# Patient Record
Sex: MALE | Race: BLACK OR AFRICAN AMERICAN | NOT HISPANIC OR LATINO | URBAN - METROPOLITAN AREA
[De-identification: names, ages, dates, MRNs, and addresses within clinical notes are randomized per-mention and may not be internally consistent; named-entity substitution may affect disease eponyms.]

---

## 2019-08-04 ENCOUNTER — EMERGENCY (EMERGENCY)
Facility: HOSPITAL | Age: 24
LOS: 1 days | Discharge: HOME | End: 2019-08-04
Attending: EMERGENCY MEDICINE
Payer: SELF-PAY

## 2019-08-04 VITALS
DIASTOLIC BLOOD PRESSURE: 95 MMHG | SYSTOLIC BLOOD PRESSURE: 175 MMHG | WEIGHT: 214.95 LBS | HEIGHT: 75 IN | RESPIRATION RATE: 18 BRPM | TEMPERATURE: 99 F | HEART RATE: 726 BPM | OXYGEN SATURATION: 96 %

## 2019-08-04 VITALS
SYSTOLIC BLOOD PRESSURE: 117 MMHG | DIASTOLIC BLOOD PRESSURE: 76 MMHG | OXYGEN SATURATION: 98 % | TEMPERATURE: 98 F | HEART RATE: 89 BPM | RESPIRATION RATE: 18 BRPM

## 2019-08-04 PROBLEM — Z00.00 ENCOUNTER FOR PREVENTIVE HEALTH EXAMINATION: Status: ACTIVE | Noted: 2019-08-04

## 2019-08-04 PROCEDURE — 73090 X-RAY EXAM OF FOREARM: CPT

## 2019-08-04 PROCEDURE — 70450 CT HEAD/BRAIN W/O DYE: CPT

## 2019-08-04 PROCEDURE — 72125 CT NECK SPINE W/O DYE: CPT | Mod: 26

## 2019-08-04 PROCEDURE — 73090 X-RAY EXAM OF FOREARM: CPT | Mod: 26,LT

## 2019-08-04 PROCEDURE — 70450 CT HEAD/BRAIN W/O DYE: CPT | Mod: 26

## 2019-08-04 PROCEDURE — 99284 EMERGENCY DEPT VISIT MOD MDM: CPT | Mod: 25

## 2019-08-04 PROCEDURE — 99053 MED SERV 10PM-8AM 24 HR FAC: CPT

## 2019-08-04 PROCEDURE — 99284 EMERGENCY DEPT VISIT MOD MDM: CPT

## 2019-08-04 PROCEDURE — 72125 CT NECK SPINE W/O DYE: CPT

## 2019-08-04 RX ORDER — ACETAMINOPHEN 500 MG
975 TABLET ORAL ONCE
Refills: 0 | Status: COMPLETED | OUTPATIENT
Start: 2019-08-04 | End: 2019-08-04

## 2019-08-04 RX ORDER — METHOCARBAMOL 500 MG/1
1500 TABLET, FILM COATED ORAL ONCE
Refills: 0 | Status: COMPLETED | OUTPATIENT
Start: 2019-08-04 | End: 2019-08-04

## 2019-08-04 RX ORDER — METHOCARBAMOL 500 MG/1
1 TABLET, FILM COATED ORAL
Qty: 21 | Refills: 0
Start: 2019-08-04 | End: 2019-08-10

## 2019-08-04 RX ADMIN — METHOCARBAMOL 1500 MILLIGRAM(S): 500 TABLET, FILM COATED ORAL at 02:52

## 2019-08-04 RX ADMIN — Medication 975 MILLIGRAM(S): at 02:53
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
## 2019-08-04 NOTE — ED PROVIDER NOTE - CHPI ED SYMPTOMS NEG
no sleeping issues/no bruising/no laceration/no disorientation/no dizziness/no decreased eating/drinking/no headache/no loss of consciousness/no crying/no fussiness
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
## 2019-08-04 NOTE — ED PROVIDER NOTE - PROGRESS NOTE DETAILS
PA NOTE: neg ct of head and neck, neg xray for fracture, Pt reassessed, pt feeling better at this time, pain has improved, vss, pt able to walk, talk and vocalized plan of action. Discussed in depth the results and findings that were performed in the ED and explained to pt in depth the next steps that need to be taking including any medications and proper follow up with PCP or specialists. Pt verbalized their concerns and all questions were answered. Pt understands dispo and agrees with discharge.
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
## 2019-08-16 NOTE — ED ADULT NURSE NOTE - INTEGUMENTARY WDL
Color consistent with ethnicity/race, warm, dry intact, resilient. Unna Boot Text: An Unna boot was placed to help immobilize the limb and facilitate more rapid healing.

## 2021-02-21 ENCOUNTER — EMERGENCY (EMERGENCY)
Facility: HOSPITAL | Age: 26
LOS: 1 days | Discharge: HOME | End: 2021-02-21
Attending: EMERGENCY MEDICINE
Payer: COMMERCIAL

## 2021-02-21 VITALS
RESPIRATION RATE: 18 BRPM | DIASTOLIC BLOOD PRESSURE: 81 MMHG | OXYGEN SATURATION: 97 % | HEART RATE: 73 BPM | SYSTOLIC BLOOD PRESSURE: 149 MMHG | HEIGHT: 75 IN | WEIGHT: 195.11 LBS | TEMPERATURE: 98 F

## 2021-02-21 PROCEDURE — 12011 RPR F/E/E/N/L/M 2.5 CM/<: CPT

## 2021-02-21 PROCEDURE — 70450 CT HEAD/BRAIN W/O DYE: CPT

## 2021-02-21 PROCEDURE — 99284 EMERGENCY DEPT VISIT MOD MDM: CPT | Mod: 25

## 2021-02-21 PROCEDURE — 70450 CT HEAD/BRAIN W/O DYE: CPT | Mod: 26,MB

## 2021-02-21 NOTE — ED PROVIDER NOTE - PHYSICAL EXAMINATION
General: In NAD, non-toxic/well-appearing; well nourished/developed.  Skin: Warm, dry, color normal for race. Approx. 2cm deep horizontal "Y-shaped" laceration to left of left eyebrow/eyelid. Bleeding controlled.   Head: Normocephalic/atraumatic.   Eyes: Sclera anicteric, conjunctivae clear b/l. PERRLA, EOMI. No nystagmus.  Neck: Supple, FROM. No midline spinal tenderness.   Cardio: Rate and rhythm regular, S1 & S2 clear. No audible murmur.  Resp: Normal AP to lateral diameter, symmetrical excursion b/l. Breath sounds vesicular, symmetrical and without rales, rhonchi or wheezing b/l.  MSK: HANNON x4. FROM.  Neuro: A&Ox3. No motor/sensory deficits. Normal gait.

## 2021-02-21 NOTE — ED ADULT TRIAGE NOTE - CHIEF COMPLAINT QUOTE
Pt comes in with laceration to L side of eyebrow after trip and fall on black ice, bleeding controlled, - LOC, no complaints at this time.

## 2021-02-21 NOTE — ED PROVIDER NOTE - PATIENT PORTAL LINK FT
You can access the FollowMyHealth Patient Portal offered by [facility] by registering at the following website: [email or web address]. By joining [facility]’s FollowMyHealth portal, you will also be able to view your health information using other applications (apps) compatible with our system.

## 2021-02-21 NOTE — ED PROVIDER NOTE - CARE PROVIDER_API CALL
Gigi Triplett)  Plastic Surgery  [address] [place] [address]  Phone: [phone]  Fax: [phone]  Follow Up Time:
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
## 2021-02-21 NOTE — ED PROVIDER NOTE - NSFOLLOWUPINSTRUCTIONS_ED_ALL_ED_FT
- Ibuprofen/acetaminophen for pain.  - Keep dressing clean, dry and in place for 24 hours. Then, may remove and cleanse using soap and water.  - Apply Bacitracin as needed.  - Suture removal 5 days. May present to primary care doctor, urgent care, or ED.  - Keep out of sun.  - Please follow up with your primary care physician in 24-48 hours.  - Please seek immediate medical attention for any new/worsening, signs/symptoms or concerns including but not limited to severe pain/swelling/surrounding redness, or drainage from wound.    Feel better!      Sutured Wound Care      Sutures are stitches that can be used to close wounds. Taking care of your wound properly can help to prevent pain and infection. It can also help your wound to heal more quickly. Follow instructions from your health care provider about how to care for your sutured wound.      Supplies needed:    •Soap and water.      •A clean bandage (dressing), if needed.      •Antibiotic ointment.      •A clean towel.        How to care for your sutured wound     •Keep the wound completely dry for the first 24 hours, or for as long as directed by your health care provider. After 24–48 hours, you may shower or bathe as directed by your health care provider. Do not soak or submerge the wound in water until the sutures have been removed.    •After the first 24 hours, clean the wound once a day, or as often as directed by your health care provider, using the following steps:  •Wash the wound with soap and water.      •Rinse the wound with water to remove all soap.      •Pat the wound dry with a clean towel. Do not rub the wound.        •After cleaning the wound, apply a thin layer of antibiotic ointment as directed by your health care provider. This will prevent infection and keep the dressing from sticking to the wound.    •Follow instructions from your health care provider about how to change your dressing:  •Wash your hands with soap and water. If soap and water are not available, use hand .      •Change your dressing at least once a day, or as often as told by your health care provider. If your dressing gets wet or dirty, change it.      •Leave sutures and other skin closures, such as adhesive tape or skin glue, in place. These skin closures may need to stay in place for 2 weeks or longer. If adhesive strip edges start to loosen and curl up, you may trim the loose edges. Do not remove adhesive strips completely unless your health care provider tells you to do that.      •Check your wound every day for signs of infection. Watch for:  •Redness, swelling, or pain.      •Fluid or blood.      •Warmth.      •Pus or a bad smell.        •Have the sutures removed as directed by your health care provider.        Follow these instructions at home:    Medicines     •Take or apply over-the-counter and prescription medicines only as told by your health care provider.      •If you were prescribed an antibiotic medicine or ointment, take or apply it as told by your health care provider. Do not stop using the antibiotic even if your condition improves.      General instructions     •To help reduce scarring after your wound heals, cover your wound with clothing or apply sunscreen of at least 30 SPF whenever you are outside.      • Do not scratch or pick at your wound.      •Avoid stretching your wound.      •Raise (elevate) the injured area above the level of your heart while you are sitting or lying down, if possible.      •Drink enough fluids to keep your urine clear or pale yellow.      •Keep all follow-up visits as told by your health care provider. This is important.        Contact a health care provider if:    •You received a tetanus shot and you have swelling, severe pain, redness, or bleeding at the injection site.      •Your wound breaks open.      •You have redness, swelling, or pain around your wound.      •You have fluid or blood coming from your wound.      •Your wound feels warm to the touch.      •You have a fever.      •You notice something coming out of your wound, such as wood or glass.      •You have pain that does not get better with medicine.      •The skin near your wound changes color.      •You need to change your dressing very frequently due to a lot of fluid, blood, or pus draining from the wound.      •You develop a new rash.      •You develop numbness around the wound.        Get help right away if:    •You develop severe swelling around your wound.      •You have pus or a bad smell coming from your wound.      •Your pain suddenly gets worse and is severe.      •You develop painful lumps near your wound or anywhere on your body.      •You have a red streak going away from your wound.    •The wound is on your hand or foot and:  •You cannot properly move a finger or toe.      •Your fingers or toes look pale or bluish.      •You have numbness that is spreading down your hand, foot, fingers, or toes.          Summary    •Sutures are stitches that can be used to close wounds.      •Taking care of your wound properly can help to prevent pain and infection.      •Keep the wound completely dry for the first 24 hours, or for as long as directed by your health care provider. After 24–48 hours, you may shower or bathe as directed by your health care provider.      This information is not intended to replace advice given to you by your health care provider. Make sure you discuss any questions you have with your health care provider. - Ibuprofen/acetaminophen for pain.  - Keep dressing clean, dry and in place for 24 hours. Then, may remove and cleanse using soap and water.  - Apply Bacitracin as needed.  - Suture removal 5 days. May present to primary care doctor, urgent care, or ED.  - You will have a scar. Keep out of sun for best healing.  - Please follow up with your primary care physician in 24-48 hours.  - Please seek immediate medical attention for any new/worsening, signs/symptoms or concerns including but not limited to severe pain/swelling/surrounding redness, or drainage from wound.    Feel better!      Sutured Wound Care      Sutures are stitches that can be used to close wounds. Taking care of your wound properly can help to prevent pain and infection. It can also help your wound to heal more quickly. Follow instructions from your health care provider about how to care for your sutured wound.      Supplies needed:    •Soap and water.      •A clean bandage (dressing), if needed.      •Antibiotic ointment.      •A clean towel.        How to care for your sutured wound     •Keep the wound completely dry for the first 24 hours, or for as long as directed by your health care provider. After 24–48 hours, you may shower or bathe as directed by your health care provider. Do not soak or submerge the wound in water until the sutures have been removed.    •After the first 24 hours, clean the wound once a day, or as often as directed by your health care provider, using the following steps:  •Wash the wound with soap and water.      •Rinse the wound with water to remove all soap.      •Pat the wound dry with a clean towel. Do not rub the wound.        •After cleaning the wound, apply a thin layer of antibiotic ointment as directed by your health care provider. This will prevent infection and keep the dressing from sticking to the wound.    •Follow instructions from your health care provider about how to change your dressing:  •Wash your hands with soap and water. If soap and water are not available, use hand .      •Change your dressing at least once a day, or as often as told by your health care provider. If your dressing gets wet or dirty, change it.      •Leave sutures and other skin closures, such as adhesive tape or skin glue, in place. These skin closures may need to stay in place for 2 weeks or longer. If adhesive strip edges start to loosen and curl up, you may trim the loose edges. Do not remove adhesive strips completely unless your health care provider tells you to do that.      •Check your wound every day for signs of infection. Watch for:  •Redness, swelling, or pain.      •Fluid or blood.      •Warmth.      •Pus or a bad smell.        •Have the sutures removed as directed by your health care provider.        Follow these instructions at home:    Medicines     •Take or apply over-the-counter and prescription medicines only as told by your health care provider.      •If you were prescribed an antibiotic medicine or ointment, take or apply it as told by your health care provider. Do not stop using the antibiotic even if your condition improves.      General instructions     •To help reduce scarring after your wound heals, cover your wound with clothing or apply sunscreen of at least 30 SPF whenever you are outside.      • Do not scratch or pick at your wound.      •Avoid stretching your wound.      •Raise (elevate) the injured area above the level of your heart while you are sitting or lying down, if possible.      •Drink enough fluids to keep your urine clear or pale yellow.      •Keep all follow-up visits as told by your health care provider. This is important.        Contact a health care provider if:    •You received a tetanus shot and you have swelling, severe pain, redness, or bleeding at the injection site.      •Your wound breaks open.      •You have redness, swelling, or pain around your wound.      •You have fluid or blood coming from your wound.      •Your wound feels warm to the touch.      •You have a fever.      •You notice something coming out of your wound, such as wood or glass.      •You have pain that does not get better with medicine.      •The skin near your wound changes color.      •You need to change your dressing very frequently due to a lot of fluid, blood, or pus draining from the wound.      •You develop a new rash.      •You develop numbness around the wound.        Get help right away if:    •You develop severe swelling around your wound.      •You have pus or a bad smell coming from your wound.      •Your pain suddenly gets worse and is severe.      •You develop painful lumps near your wound or anywhere on your body.      •You have a red streak going away from your wound.    •The wound is on your hand or foot and:  •You cannot properly move a finger or toe.      •Your fingers or toes look pale or bluish.      •You have numbness that is spreading down your hand, foot, fingers, or toes.          Summary    •Sutures are stitches that can be used to close wounds.      •Taking care of your wound properly can help to prevent pain and infection.      •Keep the wound completely dry for the first 24 hours, or for as long as directed by your health care provider. After 24–48 hours, you may shower or bathe as directed by your health care provider.      This information is not intended to replace advice given to you by your health care provider. Make sure you discuss any questions you have with your health care provider.
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
## 2021-02-22 PROBLEM — Z78.9 OTHER SPECIFIED HEALTH STATUS: Chronic | Status: ACTIVE | Noted: 2019-08-04

## 2021-02-26 ENCOUNTER — EMERGENCY (EMERGENCY)
Facility: HOSPITAL | Age: 26
LOS: 1 days | Discharge: HOME | End: 2021-02-26
Attending: EMERGENCY MEDICINE
Payer: COMMERCIAL

## 2021-02-26 VITALS
DIASTOLIC BLOOD PRESSURE: 87 MMHG | TEMPERATURE: 98 F | HEART RATE: 79 BPM | HEIGHT: 75 IN | OXYGEN SATURATION: 96 % | SYSTOLIC BLOOD PRESSURE: 145 MMHG | RESPIRATION RATE: 18 BRPM | WEIGHT: 220.02 LBS

## 2021-02-26 PROCEDURE — G0463: CPT

## 2021-02-26 PROCEDURE — L9995: CPT

## 2021-02-26 NOTE — ED PROVIDER NOTE - PATIENT PORTAL LINK FT
You can access the FollowMyHealth Patient Portal offered by [facility] by registering at the following website: [email or web address]. By joining [facility]’s FollowMyHealth portal, you will also be able to view your health information using other applications (apps) compatible with our system.

## 2023-08-01 NOTE — ED PROVIDER NOTE - CONDITION AT DISCHARGE:
[de-identified] : Left type II acromioclavicular separation.  We reviewed the condition at length.  Recommend ice anti-inflammatory medications or Tylenol as needed with gradual return to activities over the next 4 to 6 weeks.  We did discuss that he does have a partial rotator cuff tear on his MRI although this was present 2 years ago is unlikely the cause of his symptoms today he expressed understanding he will follow-up in 4 to 6 weeks if not improved Improved

## 2023-09-02 NOTE — ED PROVIDER NOTE - CROS ED EYES ALL NEG
Physical Therapy    Patient not seen in therapy.     Unavailable due to medical tests/procedures.      Re-attempt plan: per established plan of care    Per RN Ko, pt is at [facility].        OBJECTIVE                         Therapy procedure time and total treatment time can be found documented on the Time Entry flowsheet   negative...